# Patient Record
Sex: FEMALE | Employment: FULL TIME | URBAN - METROPOLITAN AREA
[De-identification: names, ages, dates, MRNs, and addresses within clinical notes are randomized per-mention and may not be internally consistent; named-entity substitution may affect disease eponyms.]

---

## 2019-04-16 ENCOUNTER — TRANSFERRED RECORDS (OUTPATIENT)
Dept: HEALTH INFORMATION MANAGEMENT | Facility: CLINIC | Age: 39
End: 2019-04-16

## 2019-04-26 ENCOUNTER — TRANSFERRED RECORDS (OUTPATIENT)
Dept: HEALTH INFORMATION MANAGEMENT | Facility: CLINIC | Age: 39
End: 2019-04-26

## 2019-05-02 ENCOUNTER — TRANSFERRED RECORDS (OUTPATIENT)
Dept: HEALTH INFORMATION MANAGEMENT | Facility: CLINIC | Age: 39
End: 2019-05-02

## 2019-05-02 ENCOUNTER — MEDICAL CORRESPONDENCE (OUTPATIENT)
Dept: HEALTH INFORMATION MANAGEMENT | Facility: CLINIC | Age: 39
End: 2019-05-02

## 2019-05-24 ENCOUNTER — TELEPHONE (OUTPATIENT)
Dept: CONSULT | Facility: CLINIC | Age: 39
End: 2019-05-24

## 2019-07-29 ENCOUNTER — TELEPHONE (OUTPATIENT)
Dept: CONSULT | Facility: CLINIC | Age: 39
End: 2019-07-29

## 2019-08-02 NOTE — TELEPHONE ENCOUNTER
Leydi called me back to schedule appointment in Genetics.  She is scheduled to see Dr. Hammond on 10/14/19.    Thanks  Olga

## 2019-10-14 ENCOUNTER — OFFICE VISIT (OUTPATIENT)
Dept: CONSULT | Facility: CLINIC | Age: 39
End: 2019-10-14
Attending: GENETIC COUNSELOR, MS
Payer: COMMERCIAL

## 2019-10-14 ENCOUNTER — OFFICE VISIT (OUTPATIENT)
Dept: CONSULT | Facility: CLINIC | Age: 39
End: 2019-10-14
Attending: MEDICAL GENETICS
Payer: COMMERCIAL

## 2019-10-14 VITALS
DIASTOLIC BLOOD PRESSURE: 90 MMHG | HEIGHT: 57 IN | BODY MASS INDEX: 24.78 KG/M2 | SYSTOLIC BLOOD PRESSURE: 136 MMHG | RESPIRATION RATE: 24 BRPM | WEIGHT: 114.86 LBS | HEART RATE: 84 BPM

## 2019-10-14 DIAGNOSIS — R62.52 SHORT STATURE: ICD-10-CM

## 2019-10-14 DIAGNOSIS — D32.0 INTRACRANIAL MENINGIOMA (H): ICD-10-CM

## 2019-10-14 DIAGNOSIS — Q78.9: Primary | ICD-10-CM

## 2019-10-14 DIAGNOSIS — E03.9 HYPOTHYROIDISM, UNSPECIFIED TYPE: ICD-10-CM

## 2019-10-14 DIAGNOSIS — E03.9 HYPOTHYROIDISM: ICD-10-CM

## 2019-10-14 DIAGNOSIS — D32.0 INTRACRANIAL MENINGIOMA (H): Primary | ICD-10-CM

## 2019-10-14 PROBLEM — M47.814 SPONDYLOSIS OF THORACIC REGION WITHOUT MYELOPATHY OR RADICULOPATHY: Status: ACTIVE | Noted: 2018-08-06

## 2019-10-14 PROBLEM — M48.02 CERVICAL STENOSIS OF SPINAL CANAL: Status: ACTIVE | Noted: 2018-08-06

## 2019-10-14 PROCEDURE — G0463 HOSPITAL OUTPT CLINIC VISIT: HCPCS | Mod: ZF

## 2019-10-14 PROCEDURE — 40000072 ZZH STATISTIC GENETIC COUNSELING, < 16 MIN: Mod: ZF | Performed by: GENETIC COUNSELOR, MS

## 2019-10-14 RX ORDER — ACETAMINOPHEN 325 MG/1
TABLET ORAL
COMMUNITY

## 2019-10-14 RX ORDER — CETIRIZINE HYDROCHLORIDE 10 MG/1
10 TABLET ORAL DAILY
COMMUNITY

## 2019-10-14 RX ORDER — TRAZODONE HYDROCHLORIDE 50 MG/1
50 TABLET, FILM COATED ORAL AT BEDTIME
COMMUNITY

## 2019-10-14 RX ORDER — FLUTICASONE PROPIONATE 220 UG/1
2 AEROSOL, METERED RESPIRATORY (INHALATION) 2 TIMES DAILY
COMMUNITY

## 2019-10-14 RX ORDER — LEVOTHYROXINE SODIUM 175 UG/1
TABLET ORAL
COMMUNITY

## 2019-10-14 RX ORDER — CYCLOBENZAPRINE HCL 10 MG
10 TABLET ORAL 2 TIMES DAILY PRN
COMMUNITY

## 2019-10-14 RX ORDER — ALBUTEROL SULFATE 0.83 MG/ML
SOLUTION RESPIRATORY (INHALATION)
COMMUNITY

## 2019-10-14 RX ORDER — BUPROPION HYDROCHLORIDE 150 MG/1
150 TABLET ORAL EVERY MORNING
COMMUNITY

## 2019-10-14 RX ORDER — ALBUTEROL SULFATE 90 UG/1
AEROSOL, METERED RESPIRATORY (INHALATION)
COMMUNITY

## 2019-10-14 RX ORDER — CALCIUM CARBONATE/VITAMIN D3 500-10/5ML
LIQUID (ML) ORAL
COMMUNITY

## 2019-10-14 RX ORDER — CHOLECALCIFEROL (VITAMIN D3) 50 MCG
1 TABLET ORAL DAILY
COMMUNITY

## 2019-10-14 ASSESSMENT — PAIN SCALES - GENERAL: PAINLEVEL: NO PAIN (0)

## 2019-10-14 ASSESSMENT — MIFFLIN-ST. JEOR: SCORE: 1068.13

## 2019-10-14 NOTE — LETTER
10/14/2019      RE: Leydi Dennis  [id number]  [address] [place] [address]       GENETICS CLINIC CONSULTATION     Name:  Leydi Dennis  :   1980  MRN:   [id number]  Date of service: Oct 14, 2019  Primary Provider: Pattie Foley  Referring Provider: Alivia Medina    Reason for consultation:  A consultation in the [facility] was requested by Alivia Medina for Leydi, a 39 year old female, for evaluation of skeletal, hormone and tumor concerns.  She also saw our genetic counselor at this visit.       Assessment:    Leydi is a 39 year old female with multiple medical concerns including endocrine abnormalities consisting of short stature with a personal family history of thyroid problems as well as a personal history of elevated parathyroid hormone, vitamin D deficiency and low bone mass in the context of known skeletal anomalies such as Madelung deforming, short stature, and a shield chest.  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  Other contributory information includes teeth enamel abnormalities into the mall being pulled but no major cognitive concerns.  She is interested in having a family and also helping with her further care for her skeletal abnormalities and concerns for triggers could be associated with neurofibromatosis type I.  Family history is significant for no one with a known diagnosis or signs or symptoms of neurofibromatosis.  However, her father does have an adrenal angiosarcoma paternal uncle had a history of glioblastoma and  at 18 years of age.  On physical exam she has some significant craniofacial dysmorphology as well as the known documented all of abnormalities.  However, she does not have the pseudoarthroses, axillary freckling, documentation of Lisch nodules, other lumps  or bumps consistent with a neurofibroma or a plexiform fibroma, or more than 6 café au lait's of the size significant to lead her to a clinical diagnosis of NF1.  However, if you consider that the optic nerve abnormality may or may not be accounted for as well as the vaginal biopsy concern neurofibroma she would have 2 major criteria and could theoretically needed based on that findings.  However, that diagnosis would not necessarily explain all of her other clinical features.  I am not certain at this time whether or not she has an underlying tumor predisposition syndrome with known effects on the skeleton, a skeletal dysplasia, neurofibromatosis type I, or a combination of those things.  Whenever a clinical picture does not fit neatly in one category the possibility of a contiguous gene micro-deletion duplication syndrome is possible.  As such, I would recommend a chromosome MicroArray.  The features are also concerning for a GNAS related syndrome that would explain many of her features.  If that testing was to return negative would recommend whole exome sequencing is most effective and efficient from both a time, likelihood of diagnosis, and cost perspective and finding a diagnosis and change in medical management.    Plan:    1. Genetic counseling consultation with Jaimie Diaz GC to obtain a pedigree and for genetic counseling regarding genetic testing to try to find a unifying diagnosis for her medical challenges..   2.  Discussed possible genetic diagnoses as above  3.  Recommendation for chromosome MicroArray and GNAS gene sequencing with copy number variant analysis.  4.  If above or return negative would recommend whole exome sequencing.  5.  Follow-up in genetics pending results of the above.  -----    History of Present Illness:  Leydi is a 39 year old female referred to the genetics clinic for evaluation of multiple medical concerns.  She has many congenital and acquired abnormalities for  which she would like to try to find a unifying diagnosis.  One concern is personal and first-degree and extended family members with hypothyroidism as well as personal history of other endocrine issues including elevated parathyroid hormone, vitamin D deficiency and low bone mass.  She is followed by endocrinology who helped facilitate her referral.  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  She had late puberty at 16 years of age.  She also had abnormalities with her teeth where she had to have them all pulled as they had abnormalities in the enamel and had short tooth roots.    In addition to the bone endocrine issue she also has multiple types of growths that have been abnormal.  She had a jaw granuloma removed at 20 years of age.  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    She also has hearing aids for both ears with small ear canals.  Her hearing loss was gradual over time and thinks it was a mixed hearing loss there was an effect both of recurrent ear infections and tubes over time though it is unclear whether or not there was a genetic/congenital predisposition component as well that would match her other symptoms.  She is followed by oncology at the [facility] for her optic nerve concerns and gets an annual MRI of her orbits for follow-up.  In addition to the eye growth she also had a mass removed from her vagina in 2018 with biopsy confirming a neurofibroma.    Leydi knows that she has an abnormal body shape as well with her short stature and a shield shape chest with a short neck.  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  We do not have that report available today.    Leydi is interested in having children in the future though she is not currently pregnant and is not in a rush to have  it done.  She is previously fertility specialist soon to help with discussion regarding endocrine abnormalities and fertility.  She has a history of irregular periods.    Reported to have a normal pregnancy and no complications with repeat .  No  complications.  Did have pneumonia at 6 months of age.  Speech therapy was needed in  and first grade.  No other major cognitive or neurodevelopmental concerns.  She has an associates degree and is working towards a bachelor degree in [employer] communication.    She states her major concern would be NF1 given the risk for malignancy.  She does realize other things could be at play and comes for genetic counseling.       Review of available medical records:  Multiple records reviewed locally and via Care Everywhere  Endocrinology 19  1. Secondary hyperparathyroidism from low vitamin D  2. Bone density showing low bone mass for age  3. Vitamin D deficiency  Craniofacial abnormalities    Please note she has been advised of genetic testing in the past due to above problems (hypothyroidism, elevated PTH levels, optic nerve sheath meningioma) and also to include congenital spinal canal narrowing, developmental delay, craniofacial abnormalities, short stature but patient had refused due to the expenses involved    A/P: Today, she reports she wishes for referral to a genetic specialist       Pertinent studies/abnormal test results:   IGF1 z score 19 -0.84    Component Name  2019     16.1 8.3     18.9 4.6 6.7   5.68 14.82 10.7   LUTEINIZING HORMONE         [facility]   PROLACTIN     PAth report [place] right optic nerve excision 16   DIAGNOSIS:     A.  Optic nerve, right, excision:  Meningioma, meningothelial type,    WHO type I.     Imaging results:   CT Ab and Pelvis 19  IMPRESSION:  1. Findings again noted to be consistent with acute interstitial edematous  pancreatitis. No peripancreatic collection or necrosis.  2.  Nonobstructing right nephrolithiasis.    Annual MRI of orbits at [facility] followed by oncology    CT temporal bone 9/4/18  RIGHT: Findings are compatible with postoperative change tympanic membrane. Mild  thinning of bone overlying the superior semicircular canal and posterior  semicircular canal without definite kemi dehiscence. Osseous middle ear  structures, osseous inner ear structures, and EAC are otherwise unremarkable.  Mastoid air cells are underpneumatized. Opacification of the few present mastoid  air cells.    LEFT: Mild retraction left tympanic membrane. Osseous middle ear structures,  osseous inner ear structures, and the EAC are otherwise unremarkable. No  evidence of superior semicircular canal dehiscence. Mastoid air cells are  underpneumatized. Opacification of the few present mastoid air cells.    OTHER: Scattered moderate mucosal thickening visualized paranasal sinuses.  Intracranial arterial calcifications.    Past Medical History:  Patient Active Problem List   Diagnosis     Benign neoplasm of optic nerve (H)     Cervical stenosis of spinal canal     Asthma with exacerbation     Hypoparathyroidism (H)     Hypothyroidism     Intracranial meningioma (H)     Spondylosis of thoracic region without myelopathy or radiculopathy       Surgical History:  . Laterality Date     (IA) SURGICAL PROCEDURE   1991--tympanoplasty right failed.     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    Personal History  Family History:    A detailed pedigree was obtained by the genetic counselor at the time of this appointment and will be sent for scanning into the electronic medical record. I personally reviewed and discussed the pedigree with the GC and the family and concur with the GC note. Please refer to the formal pedigree for full details.  Per GC note:      Siblings- Brother is 41y with no health/development concerns. Sister is 35y with hypothyroidism and history of two miscarriages. Brother is 33y with no health/development concerns.     Nieces/Nephews-  13yo nephew (son of patient's 42yo brother) with behavioral concerns. 7yo nephew (son of patient's 42yo brother) with behavioral concerns and history of surgery after birth for a \"skull deformity\". No other nieces or nephews with health/development concerns.     Mother- is 64y, 5'3\", with arthritis, knee replacement and depression    Maternal Relatives- Aunt with history of anemia/anorexia. Grandfather is 92y with COPD. Grandmother  in her 70s from complications of DM; she was born premature, and 4'6\" or 4'7\" in height    Father-  at 49yo from adrenal angiosarcoma diagnosed at 49y. He was 5'10\", and had a history of poor teeth.     Paternal Relatives- Aunt  in her 40s from pneumonia; she had a history of cerebral palsy and used a wheelchair. Uncle  at 17yo and had a h/o glioblastoma. Grandfather  in his 80s and had a hisotry of prostate cancer diagnosed in his 70s that was just monitored. Grandmother  in her 70s or 80s and had a history of hypothyroidism and mental health conditions.     Family history is otherwise largely non-contributory. There were no other reports of short stature, osteoporosis, many or easy fractures, endocrine concerns, cancer, tumors, cafe au lait spots, birth defects, developmental delay, intellectual disability, " recurrent pregnancy loss, stillbirth, or early onset vision/hearing loss. Maternal ancestry is Citizen of [place] and paternal ancestry is Polish/[place]/Slovakian/Portuguese. Consanguinity was denied.     Social History:  Tobacco Use     Smoking status: Current Some Day Smoker   Packs/day: 0.25   Years: 10.00   Pack years: 2.50   Types: Cigarettes     Smokeless tobacco: Never Used   Substance Use Topics     Alcohol use: Yes   Frequency: Monthly or less   Drinks per session: 1 or 2   Binge frequency: Never     Drug use: No     Social History  Social History Narrative  Lives in [place] with roommate.   Works at front ki work in [employer]      I have reviewed Leydi castillo past medical history, family history, social history, medications and allergies as documented in the electronic medical record.  There were no additional findings except as noted.    Medications:  Current Outpatient Medications   Medication Sig Dispense Refill     acetaminophen (TYLENOL) 325 MG tablet 1-2 tablets every 4 hours as needed.       albuterol (PROAIR HFA/PROVENTIL HFA/VENTOLIN HFA) 108 (90 Base) MCG/ACT inhaler 1-2 puffs four times a day as needed.       albuterol (PROVENTIL) (2.5 MG/3ML) 0.083% neb solution Inhale 3 ml via a nebulizer every four as needed.       buPROPion (WELLBUTRIN XL) 150 MG 24 hr tablet Take 150 mg by mouth every morning       calcium citrate-vitamin D (CITRACAL) 315-250 MG-UNIT TABS per tablet Take 2 tablets by mouth daily       cetirizine (ZYRTEC) 10 MG tablet Take 10 mg by mouth daily       cyclobenzaprine (FLEXERIL) 10 MG tablet Take 10 mg by mouth 2 times daily as needed for muscle spasms       FLUoxetine (PROZAC) 20 MG capsule Take 20 mg by mouth daily       fluticasone (FLOVENT HFA) 220 MCG/ACT inhaler Inhale 2 puffs into the lungs 2 times daily       IRON CARBONYL-VITAMIN C-FOS  mg twice weekly.       levothyroxine (SYNTHROID/LEVOTHROID) 175 MCG tablet 1 tablet 6 days and half 7th day.       magnesium oxide 400 MG  "CAPS 1 tablet daily.       traZODone (DESYREL) 50 MG tablet Take 50 mg by mouth At Bedtime       vitamin D3 (CHOLECALCIFEROL) 2000 units (50 mcg) tablet Take 1 tablet by mouth daily         Allergies:  No Known Allergies    Physical Examination:  Blood pressure (!) 136/90, pulse 84, resp. rate 24, height 4' 8.89\" (144.5 cm), weight 114 lb 13.8 oz (52.1 kg), head circumference 53.5 cm (21.06\").  Weight %tile:  Wt Readings from Last 3 Encounters:   10/14/19 114 lb 13.8 oz (52.1 kg)     Height %tile:   Ht Readings from Last 3 Encounters:   10/14/19 4' 8.89\" (144.5 cm)     Head Circumference %tile:   HC Readings from Last 3 Encounters:   10/14/19 53.5 cm (21.06\")     BMI %tile: Normalized BMI data available only for age 0 to 20 years.    Constitutional: This was a well-developed, well nourished female who responded appropriately to all requests during the examination with a nasal tone to her speech..    Head and Neck:  She had hair of normal texture and distribution and her head was proportionate in appearance.  The face was symmetric and did not have dysmorphic features.   Eyes:  The pupils were equal, round, and reacted to light.   The conjunctivae were clear.   Ears:  Her ears were small in size but normally set.   Nose: The nose was clear.    Mouth and Throat: The throat was without erythema.    Respiratory: The chest was clear to auscultation.  Prominent borad sternum with possible carinatum (Shield chest) and had a symmetric appearance.    Cardiovascular:  On examination of the heart, the rhythm was regular and there was no murmur.  The peripheral pulses were normal.    Gastrointestinal: The abdomen was soft and had normal bowel sounds.  There was no hepatosplenomegaly.    : I deferred a  examination.   Musculoskeletal: There was a full range of motion on the extremity exam, and normal muscular volume and bulk. There was no evidence of scoliosis. Ankles rolling inward at rest and with movement.  Falling, low " arches in fet.  Large hands with Madelung abnormality to wrist and 4th and 5th digits.  No major concern for a pseudoarthrosis.  Neurologic: The neurologic exam was normal, with normal cranial nerves, normal deep tendon reflexes, normal sensation, and a normal gait. She had normal tone.   Integument: Raised pencil eraser sized tan papule on thigh removed with well healed scar.  She does not have axillary freckling that would be consistent.   exam deferred.  No other lumps or bumps concerning for neurofibroma or plexiform mass.  She does not have more than 6 café au lait's greater than 1.5 cm.    Total time of 80 minutes spent, 65 face-to-face with 45 minutes (>50%) spent in counseling and/or coordination of care.    Bob Hammond MD/PhD, , [facility]  Division of Genetics and Metabolism  Department of Pediatrics  [facility]    Routed to family in Comm Mgt  Also to  Pattie Foley Namitha K Bhat

## 2019-10-14 NOTE — PATIENT INSTRUCTIONS
Genetics  [facility]     Contact our nurse coordinator at [phone] or send a [employer] message for any non-urgent general or medical questions.     If you had genetic testing and have further questions, please contact the genetic counselor who saw you during your visit.    Jaimie Diaz MS, [facility]  Licensed & Certified Genetic Counselor   [facility]  Phone: [phone]      To schedule appointments:  Pediatric Call Center for [facility]: [phone]  Neuropsychology [phone]  Radiology/ Imaging/Echocardiogram: [phone]   Services:   [phone]     Please consider signing up for [email or web address] for easy and confidential communication. Please sign up at the clinic  or go to [email or web address].

## 2019-10-14 NOTE — NURSING NOTE
"Chief Complaint   Patient presents with     Consult     Genetic/hypothyroidism.     Vitals:    10/14/19 1412   BP: (!) 136/90   BP Location: Right arm   Patient Position: Chair   Pulse: 84   Resp: 24   Weight: 114 lb 13.8 oz (52.1 kg)   Height: 4' 8.89\" (144.5 cm)   HC: 53.5 cm (21.06\")      Dipti Dao M.A.  October 14, 2019  "

## 2019-10-16 NOTE — PROGRESS NOTES
Name: Leydi Dennis   : 1980  MRN: [id number]  Date of visit: 10/14/18    Presenting Information:   Leydi Dennis is a 39 year old female referred to the [facility] due to her skeletal anomalies, hormone concerns, and tumor history. She was seen for a genetic counseling appointment in coordination with Dr. Hammond who completed an evaluation (see Dr. Hammond's clinic note for further documentation regarding this evaluation).  I met with the family at the request of Dr. Hammond  to obtain a personal and family history, discuss the possible genetic contributions to Leydi's symptoms, and to obtain informed consent for genetic testing.    Personal History:  Leydi is a 39-year old female who presents to Genetics for initial evaluation.    Leydi has hypothyroidism (on synthroid), and has a family history of PGM and sister with hypothyroidism. She also has elevated PTH, vitamin D defiency and low bone mass.     She has plans to see a fertility specialist soon; she has not been pregnant, but has not been actively trying. Per recent Endocrinology visit (19), her pituitary function labs from 2019 were all normal except low normal estradiol at 23 with high FSH 18.9 and LH 16.1, although this could be related to normal ovulation.  There is also report of the patient seeing an endocrinologist at the [facility] for concerns with fertility and was told her AMH level was low, but there are no confirmed records of this.    Leydi is noted to have short stature, shield-shaped chest, and a short neck. Karyotype is reported as 46, XX, but the report is not available.  There is also note, but no records, of a hand x-ray that was done (to evaluate for pseudohypoparathyroidism) which showed borderline shortening of the fourth and fifth metacarpals with mild undertubulation, mild Madelung deformity of the wrist, and was otherwise negative    Patient is reported to have h/o bilateral optic nerve sheath  "meningioma diagnosed in 2016 after experiencing blurry vision. She is s/p radiation and has no vision in her right eye. Her father has a history of adrenal angioscarcoma () s/p radiation. 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     She also noted she has a history of weak enamel of her teeth and chips in multiple teeth. She had her teeth extracted during which it was noted the roots of there teeth were \"short\". She now wears dentures. She has \"clicking\" in her right ear, and wears hearing aids bilaterally, and reports \"small ear canals\". She has a granuloma of her jaw removed sometime between age [age]. She has a history of irregular menses, but has had a regular cycle for the past several months. She reports she thinks she has had one miscarriage. Leydi reports she has about one serous case of pneumonia or bronchitis a year and has been hospitalized multiple times in the past for this. Leydi noted depression and anxiety for which she is treated.     Pregnancy/Birth History:  Leydi was born to a then  mother via repeat . Prenatal history is unremarkable. No known prenatal alcohol/tobacco/drug/medication/environmental exposures. Delivery was uncomplicated.  history is notable for pneumonia at 6 months    Developmental History:  Leydi has a history of needing speech therapy when she was in  or first grade. She is currently working at [employer] and attending college part time. She already has an Associates degree and is working towards a bachelors degree in strategic communication.     Please see Dr. Hammond's note for further information regarding the patient's history and today's evaluations    Family History: A three generation pedigree was obtained and scanned into the electronic medical record. The relevant portions are " "described below:      Siblings- Brother is 41y with no health/development concerns. Sister is 35y with hypothyroidism and history of two miscarriages. Brother is 33y with no health/development concerns.     Nieces/Nephews-  15yo nephew (son of patient's 40yo brother) with behavioral concerns. 7yo nephew (son of patient's 40yo brother) with behavioral concerns and history of surgery after birth for a \"skull deformity\". No other nieces or nephews with health/development concerns.     Mother- is 64y, 5'3\", with arthritis, knee replacement and depression    Maternal Relatives- Aunt with history of anemia/anorexia. Grandfather is 92y with COPD. Grandmother  in her 70s from complications of DM; she was born premature, and 4'6\" or 4'7\" in height    Father-  at 49yo from adrenal angiosarcoma diagnosed at 49y. He was 5'10\", and had a history of poor teeth.     Paternal Relatives- Aunt  in her 40s from pneumonia; she had a history of cerebral palsy and used a wheelchair. Uncle  at 17yo and had a h/o glioblastoma. Grandfather  in his 80s and had a hisotry of prostate cancer diagnosed in his 70s that was just monitored. Grandmother  in her 70s or 80s and had a history of hypothyroidism and mental health conditions.    Family history is otherwise largely non-contributory. There were no other reports of short stature, osteoporosis, many or easy fractures, endocrine concerns, cancer, tumors, cafe au lait spots, birth defects, developmental delay, intellectual disability, recurrent pregnancy loss, stillbirth, or early onset vision/hearing loss. Maternal ancestry is [place]ian and paternal ancestry is Polish/Cze[place]/Slovakian/Gibraltarian. Consanguinity was denied.     Genetic Counseling Discussion:  Genetic Review:   We reviewed that our bodies are made up of millions of cells, which serve as the building blocks for our bodies. In our cells are structures called chromosomes which package our DNA, or genetic " material. Typically, there are two copies of each chromosome (one from mother and one from father). The first 22 pairs are the same in males and females. The last pair are the sex chromosomes. Typically, males have an X and Y chromosome, and females have two X chromosomes. Our chromosomes are broken up into segments called genes that serve as the instruction manuals for our bodies. Typically there are two copies of most of our genes. Genes are made up of a series of  letters  that our body reads to carry out specific functions. If there is a variant (genetic change)  the body has difficulty reading it, which disrupts the function of the genes. When genes do not function properly, disease can result.     It can be important to know if there is an underlying genetic cause for Leydi's symptoms for several reasons. First and foremost, this can be important for Leydi's own health. It is possible that an underlying cause may also predispose her to other health risks. Knowing about these additional health risks can help us stay ahead of her healthcare to more appropriately screen for other complications. Secondly, discovering an underlying reason may help predict the chance for her to have child with similar healthcare needs. It can also be helpful in identifying other family members at risk for health concerns or having a child with health concerns. Finally, having a specific underlying diagnosis can sometimes help individuals receive the services they need.    Testing Plan/Information:   We recommend completing a chromosomal microarray test to look for deletions associated with SHOX deficiency disorders, or other deletions/duplications that would explain Leydi's symptoms. We also recommend Next Generation Sequencing of the GNAS gene to test for GNAS-related disorders that could explain her symptoms. If testing is negative we recommend proceeding with Exome Sequencing. This will look for sequence variants in SHOX not able  to be detected on CMA technology, as well at test for other genetic causes of the patient's symptoms.    We reviewed the differences between karyotype, chromosomal microarray (CMA), and Next Generation Sequencing (NGS) testing. We also briefly reviewed the concept of exome sequencing. Each test uses different technology, and test for different types of genetic variants. We also discussed the benefits, limitations, and risks associated with these tests. We reviewed that genetic testing has three possible results      Negative: meaning normal or no genetic variants were found in the areas tested.    Positive: meaning a pathogenic variant that is known to be associated with a particular set of symptoms is identified    Variant of uncertain significance (VUS): meaning a genetic variant was seen but there is not enough information or data yet to know if it explains the symptoms. If a VUS is identified, testing of other relatives may be helpful to provide clarification.  In most cases, identification of a VUS does not confirm a diagnosis and does not result in any clinically actionable recommendations.    We discussed the potential benefits of genetic testing and why this genetic testing is medically indicated. A positive result will help determine the etiology of Leydi's health concerns  and may guide the medical management for Leydi. Also, if a genetic cause is found for Leydi's health concerns, it will give us a more accurate risk assessment for other family members.    CMA:   Based on the assessment today, we recommend a chromosomal microarray to investigate if there is a possible underlying chromosomal cause for Leydi's features of short stature, congenital skeletal anomalies (madelung deformity, shortening of 4th and 5th metacarpals) and facial dysmorphisms.     We reviewed that a chromosome microarray examines the chromosomes for extra or missing pieces. We discussed the details and limitations of a microarray such  as the limitation that a microarray cannot detect balanced chromosome rearrangements and the possibility that this test can possibly reveal undisclosed family relationships. According to the American College of Medical Genetics, chromosomal microarray, including array CGH and SNP array, is the first-tier test for the evaluation of imbalances associated with intellectual disability, autism, developmental delay, and multiple congenital anomalies. A chromosomal microarray identifies a chromosome abnormality in 15-20% of cases with the indication of developmental delay, intellectual disability, and/or multiple congenital anomalies.     A positive result will help determine the etiology of the congenital anomalies noted in Leydi and may guide the medical management for her. Also, if a genetic cause is found for Leydi's symptoms, it will give us a more accurate risk assessment for other family members, particularly future children for Leydi's future children. The inheritance pattern is dependent on what condition is identified based on testing. Establishing a genetic diagnosis is important as it can help to clarify the prognosis, and determine appropriate medical care.     CMA testing does not test for all genetic conditions. Therefore, if the CMA is inconclusive we may consider additional testing options.     NGS:  Based on the assessment today, we recommend NGS testing of the GNAS gene to look for a genetic cause for Leydi's endocrine dysfunction, short stature and skeletal differences. This testing will sequence the GNAS gene to look for pathogenic variants that could be causing her symptoms.     Next Generation Sequencing is a well established technology utilized by all molecular genetic labs throughout the country for identifying disease-causing variants in various genes.  Next Generation Sequencing is currently the standard of care for genetic testing of single genes.  The recommended testing for Leydi is DIAGNOSTIC  testing, and it is NOT investigational. NGS testing does not test for all genetic conditions. Therefore, if the NGS testing is inconclusive we may consider additional testing options.     Leydi wishes to pursue genetic testing today. We will submit information for prior authorization and Leydi will be contacted regarding the authorization status and potential cost of testing. If we proceed with testing, I will call them with the results.  Informed consent form for CMA and NGS was discussed in detail and signed by Leydi.    It was a pleasure meeting Leydi today. She was encouraged to reach out to me if she has any further questions.     Plan:  1. Prior authorization was initiated with our piror authorization team. They will call the family with the determination once it is received.   2. Blood will be drawn after prior authorization is received. It will be sent to the [facility] for a CGH microarray with SNP, limited karyotype, and  NGS testing of the GNAS gene.  3. Results will be returned by phone, and a follow-up appointment will be scheduled at that time.  4. Contact information was provided should any questions arise in the future.        Jaimie Diaz MS, [facility]  Certified Genetic Counselor   [facility]  Phone: [phone]    Time spent in consultation face to face was approximately 55 minutes.    CC:No letter    ADDENDUM: edited to update testing plan to include both tests

## 2019-11-12 PROBLEM — M47.896 OTHER SPONDYLOSIS, LUMBAR REGION: Status: ACTIVE | Noted: 2018-08-06

## 2019-11-12 PROBLEM — D32.0 PRIMARY MENINGIOMA OF OPTIC NERVE SHEATH (H): Status: ACTIVE | Noted: 2017-12-05

## 2019-11-12 PROBLEM — F17.210 CIGARETTE NICOTINE DEPENDENCE WITHOUT COMPLICATION: Status: ACTIVE | Noted: 2018-05-24

## 2020-02-10 ENCOUNTER — TELEPHONE (OUTPATIENT)
Dept: CONSULT | Facility: CLINIC | Age: 40
End: 2020-02-10

## 2020-02-10 NOTE — TELEPHONE ENCOUNTER
Notified Leydi that we received partial prior authorization approval for her genetic testing.     Explained that insurance benefits may still apply, therefore, there could be an out of pocket cost. Provided Leydi with estimated out of pocket cost for testing.    Leydi expressed understanding and stated that she needs time to think about it before making a decision. Leydi had no further questions.    MEIR Davis  Genomics Billing    [facility]   Molecular Diagnostics Laboratory  [address] [place] [id number] [phone]

## 2020-02-13 ENCOUNTER — TELEPHONE (OUTPATIENT)
Dept: CONSULT | Facility: CLINIC | Age: 40
End: 2020-02-13
Payer: COMMERCIAL

## 2020-02-13 NOTE — TELEPHONE ENCOUNTER
Left non-detailed voice mail asking for call back.     Called to answer insurance/testing questions   Appropriate/Calm

## 2020-04-22 DIAGNOSIS — D32.0 INTRACRANIAL MENINGIOMA (H): ICD-10-CM

## 2020-04-22 DIAGNOSIS — E03.9 HYPOTHYROIDISM, UNSPECIFIED TYPE: ICD-10-CM

## 2020-04-22 DIAGNOSIS — R62.52 SHORT STATURE: ICD-10-CM

## 2020-04-22 DIAGNOSIS — Q78.9: Primary | ICD-10-CM

## 2021-04-29 DIAGNOSIS — R62.52 SHORT STATURE: ICD-10-CM

## 2021-04-29 DIAGNOSIS — D32.0 INTRACRANIAL MENINGIOMA (H): ICD-10-CM

## 2021-04-29 DIAGNOSIS — E03.9 HYPOTHYROIDISM, UNSPECIFIED TYPE: ICD-10-CM

## 2021-04-29 DIAGNOSIS — Q78.9: Primary | ICD-10-CM

## 2021-05-12 ENCOUNTER — TELEPHONE (OUTPATIENT)
Dept: CONSULT | Facility: CLINIC | Age: 41
End: 2021-05-12

## 2021-05-12 NOTE — TELEPHONE ENCOUNTER
Notified Leydi that we received prior authorization approval for her genetic testing. Valid from 4/30/2021 to 9/30/2021. Authorization number is [id number].     Explained that insurance benefits may still apply, therefore, there could be an out of pocket cost.    Leydi expressed understanding and stated that she wants to proceed with testing. Leydi will schedule a blood draw appointment at the [place] location. Leydi had no further questions.    MEIR Davis  Genomics Billing    [facility]   Molecular Diagnostics Laboratory  [address] [place] [id number]  [phone]

## 2021-05-18 DIAGNOSIS — R62.52 SHORT STATURE: ICD-10-CM

## 2021-05-18 DIAGNOSIS — E03.9 HYPOTHYROIDISM, UNSPECIFIED TYPE: ICD-10-CM

## 2021-05-18 DIAGNOSIS — D32.0 INTRACRANIAL MENINGIOMA (H): ICD-10-CM

## 2021-05-18 DIAGNOSIS — Q78.9: ICD-10-CM

## 2021-05-18 PROCEDURE — 99N1104 PR STATISTIC DNA ISOL HIGH PURITY: Performed by: MEDICAL GENETICS

## 2021-05-19 LAB — COPATH REPORT: NORMAL

## 2021-05-24 DIAGNOSIS — E03.9 HYPOTHYROIDISM, UNSPECIFIED TYPE: ICD-10-CM

## 2021-05-24 DIAGNOSIS — D32.0 INTRACRANIAL MENINGIOMA (H): ICD-10-CM

## 2021-05-24 DIAGNOSIS — Q78.9: Primary | ICD-10-CM

## 2021-05-24 DIAGNOSIS — R62.52 SHORT STATURE: ICD-10-CM

## 2021-05-24 PROCEDURE — G0452 MOLECULAR PATHOLOGY INTERPR: HCPCS | Mod: 26 | Performed by: PATHOLOGY

## 2021-05-24 PROCEDURE — 81479 UNLISTED MOLECULAR PATHOLOGY: CPT | Performed by: MEDICAL GENETICS

## 2021-06-07 LAB — COPATH REPORT: NORMAL

## 2021-06-18 ENCOUNTER — TELEPHONE (OUTPATIENT)
Dept: CONSULT | Facility: CLINIC | Age: 41
End: 2021-06-18

## 2021-06-18 DIAGNOSIS — E03.9 HYPOTHYROIDISM, UNSPECIFIED TYPE: ICD-10-CM

## 2021-06-18 DIAGNOSIS — Q78.9: Primary | ICD-10-CM

## 2021-06-18 DIAGNOSIS — R62.52 SHORT STATURE: ICD-10-CM

## 2021-06-18 DIAGNOSIS — D32.0 INTRACRANIAL MENINGIOMA (H): ICD-10-CM

## 2021-06-21 NOTE — TELEPHONE ENCOUNTER
Spoke with Leydi regarding her test results    Reviewed genetic testing technology and results for the next generation sequencing gene panel.    Leydi's testing looked specifically at genes associated with GNAS. According to the Molecular Diagnostic Laboratory at the [facility], the result of Leydi's NGS panel was normal/negative.       This is consistent with a negative genetic test result, which means that we did not find a pathogenic variant in the sequence of genes currently known to be associated with GNAS-related disorder. This type of testing accounts for the vast majority of the genetic causes of this condition. However, this negative result does not entirely eliminate the chance for this condition. In addition, this testing does not rule out all genetic conditions. The patient's features could still have an underlying genetic cause. This could be due to other genetic changes in genes not tested, or genetic changes that this technology cannot detect.     There was a mix up with the lab order. Order placed for CGH+SNP+G-bands. Order now in. Leydi going this week for the draw. Apologized for mix up.    All questions answered.

## 2021-06-25 DIAGNOSIS — Q78.9: ICD-10-CM

## 2021-06-25 DIAGNOSIS — R62.52 SHORT STATURE: ICD-10-CM

## 2021-06-25 DIAGNOSIS — E03.9 HYPOTHYROIDISM, UNSPECIFIED TYPE: ICD-10-CM

## 2021-06-25 DIAGNOSIS — D32.0 INTRACRANIAL MENINGIOMA (H): ICD-10-CM

## 2021-06-25 PROCEDURE — 99N1104 PR STATISTIC DNA ISOL HIGH PURITY: Performed by: GENETIC COUNSELOR, MS

## 2021-06-25 PROCEDURE — 81229 CYTOG ALYS CHRML ABNR SNPCGH: CPT | Performed by: GENETIC COUNSELOR, MS

## 2021-06-25 PROCEDURE — 36415 COLL VENOUS BLD VENIPUNCTURE: CPT | Performed by: GENETIC COUNSELOR, MS

## 2021-07-16 ENCOUNTER — TELEPHONE (OUTPATIENT)
Dept: CONSULT | Facility: CLINIC | Age: 41
End: 2021-07-16

## 2021-07-16 LAB — COPATH REPORT: NORMAL

## 2021-07-16 NOTE — TELEPHONE ENCOUNTER
Spoke with Leydi to discuss the results of her genetic testing. These results were negative, meaning  no clinically significant genetic changes were found in that explain Leydi's symptoms.      Array CGH with SNP: negative    Limited karyotype: negative    We discussed that this test has ruled out some genetic conditions, but this does not exclude a genetic etiology for Leydi's symptoms. Because of this, we would like to continue to see Leydi in genetics clinic for follow-up. We will evaluate the need for genetic testing at that appointment.    There were no further questions or concerns.    Jaimie Diaz MS, [facility]  Licensed & Certified Genetic Counselor   [facility]  Phone: [phone]  Fax: [phone]

## 2021-07-18 ENCOUNTER — HEALTH MAINTENANCE LETTER (OUTPATIENT)
Age: 41
End: 2021-07-18

## 2021-09-12 ENCOUNTER — HEALTH MAINTENANCE LETTER (OUTPATIENT)
Age: 41
End: 2021-09-12

## 2022-08-14 ENCOUNTER — HEALTH MAINTENANCE LETTER (OUTPATIENT)
Age: 42
End: 2022-08-14

## 2022-11-19 ENCOUNTER — HEALTH MAINTENANCE LETTER (OUTPATIENT)
Age: 42
End: 2022-11-19

## 2023-09-09 ENCOUNTER — HEALTH MAINTENANCE LETTER (OUTPATIENT)
Age: 43
End: 2023-09-09

## 2024-04-06 ENCOUNTER — HEALTH MAINTENANCE LETTER (OUTPATIENT)
Age: 44
End: 2024-04-06